# Patient Record
Sex: FEMALE
[De-identification: names, ages, dates, MRNs, and addresses within clinical notes are randomized per-mention and may not be internally consistent; named-entity substitution may affect disease eponyms.]

---

## 2020-03-30 ENCOUNTER — HOSPITAL ENCOUNTER (INPATIENT)
Dept: HOSPITAL 5 - ED | Age: 40
LOS: 2 days | Discharge: HOME | DRG: 897 | End: 2020-04-01
Attending: INTERNAL MEDICINE | Admitting: INTERNAL MEDICINE
Payer: MEDICARE

## 2020-03-30 DIAGNOSIS — F32.9: ICD-10-CM

## 2020-03-30 DIAGNOSIS — E87.6: ICD-10-CM

## 2020-03-30 DIAGNOSIS — E03.9: ICD-10-CM

## 2020-03-30 DIAGNOSIS — Z88.8: ICD-10-CM

## 2020-03-30 DIAGNOSIS — Z90.49: ICD-10-CM

## 2020-03-30 DIAGNOSIS — E11.9: ICD-10-CM

## 2020-03-30 DIAGNOSIS — F10.231: Primary | ICD-10-CM
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
## 2020-03-30 DIAGNOSIS — Z79.899: ICD-10-CM

## 2020-03-30 DIAGNOSIS — F39: ICD-10-CM

## 2020-03-30 LAB
ALBUMIN SERPL-MCNC: 3.6 G/DL (ref 3.9–5)
ALT SERPL-CCNC: 18 UNITS/L (ref 7–56)
APTT BLD: 31 SEC. (ref 24.2–36.6)
BASOPHILS # (AUTO): 0 K/MM3 (ref 0–0.1)
BASOPHILS # (AUTO): 0 K/MM3 (ref 0–0.1)
BASOPHILS NFR BLD AUTO: 0.3 % (ref 0–1.8)
BASOPHILS NFR BLD AUTO: 0.4 % (ref 0–1.8)
BENZODIAZEPINES SCREEN,URINE: (no result)
BILIRUB UR QL STRIP: (no result)
BLOOD UR QL VISUAL: (no result)
BUN SERPL-MCNC: 11 MG/DL (ref 7–17)
BUN/CREAT SERPL: 18 %
CALCIUM SERPL-MCNC: 8.5 MG/DL (ref 8.4–10.2)
EOSINOPHIL # BLD AUTO: 0 K/MM3 (ref 0–0.4)
EOSINOPHIL # BLD AUTO: 0 K/MM3 (ref 0–0.4)
EOSINOPHIL NFR BLD AUTO: 0.3 % (ref 0–4.3)
EOSINOPHIL NFR BLD AUTO: 0.4 % (ref 0–4.3)
HCT VFR BLD CALC: 29.8 % (ref 30.3–42.9)
HCT VFR BLD CALC: 31.1 % (ref 30.3–42.9)
HEMOLYSIS INDEX: 17
HGB BLD-MCNC: 10.1 GM/DL (ref 10.1–14.3)
HGB BLD-MCNC: 10.5 GM/DL (ref 10.1–14.3)
INR PPP: 1.16 (ref 0.87–1.13)
LYMPHOCYTES # BLD AUTO: 0.5 K/MM3 (ref 1.2–5.4)
LYMPHOCYTES # BLD AUTO: 1 K/MM3 (ref 1.2–5.4)
LYMPHOCYTES NFR BLD AUTO: 17.6 % (ref 13.4–35)
LYMPHOCYTES NFR BLD AUTO: 8 % (ref 13.4–35)
MCHC RBC AUTO-ENTMCNC: 34 % (ref 30–34)
MCHC RBC AUTO-ENTMCNC: 34 % (ref 30–34)
MCV RBC AUTO: 91 FL (ref 79–97)
MCV RBC AUTO: 91 FL (ref 79–97)
METHADONE SCREEN,URINE: (no result)
MONOCYTES # (AUTO): 0.4 K/MM3 (ref 0–0.8)
MONOCYTES # (AUTO): 0.6 K/MM3 (ref 0–0.8)
MONOCYTES % (AUTO): 6.3 % (ref 0–7.3)
MONOCYTES % (AUTO): 9.9 % (ref 0–7.3)
MUCOUS THREADS #/AREA URNS HPF: (no result) /HPF
OPIATE SCREEN,URINE: (no result)
PH UR STRIP: 6 [PH] (ref 5–7)
PLATELET # BLD: 176 K/MM3 (ref 140–440)
PLATELET # BLD: 181 K/MM3 (ref 140–440)
RBC # BLD AUTO: 3.29 M/MM3 (ref 3.65–5.03)
RBC # BLD AUTO: 3.43 M/MM3 (ref 3.65–5.03)
RBC #/AREA URNS HPF: 5 /HPF (ref 0–6)
T4 FREE SERPL-MCNC: 1.24 NG/DL (ref 0.76–1.46)
UROBILINOGEN UR-MCNC: 2 MG/DL (ref ?–2)
WBC #/AREA URNS HPF: 2 /HPF (ref 0–6)

## 2020-03-30 PROCEDURE — 85730 THROMBOPLASTIN TIME PARTIAL: CPT

## 2020-03-30 PROCEDURE — 82962 GLUCOSE BLOOD TEST: CPT

## 2020-03-30 PROCEDURE — 80048 BASIC METABOLIC PNL TOTAL CA: CPT

## 2020-03-30 PROCEDURE — 90715 TDAP VACCINE 7 YRS/> IM: CPT

## 2020-03-30 PROCEDURE — 80320 DRUG SCREEN QUANTALCOHOLS: CPT

## 2020-03-30 PROCEDURE — 70450 CT HEAD/BRAIN W/O DYE: CPT

## 2020-03-30 PROCEDURE — 72125 CT NECK SPINE W/O DYE: CPT

## 2020-03-30 PROCEDURE — 84443 ASSAY THYROID STIM HORMONE: CPT

## 2020-03-30 PROCEDURE — 85025 COMPLETE CBC W/AUTO DIFF WBC: CPT

## 2020-03-30 PROCEDURE — 80307 DRUG TEST PRSMV CHEM ANLYZR: CPT

## 2020-03-30 PROCEDURE — 84439 ASSAY OF FREE THYROXINE: CPT

## 2020-03-30 PROCEDURE — 36415 COLL VENOUS BLD VENIPUNCTURE: CPT

## 2020-03-30 PROCEDURE — 83735 ASSAY OF MAGNESIUM: CPT

## 2020-03-30 PROCEDURE — 82550 ASSAY OF CK (CPK): CPT

## 2020-03-30 PROCEDURE — 81001 URINALYSIS AUTO W/SCOPE: CPT

## 2020-03-30 PROCEDURE — 84703 CHORIONIC GONADOTROPIN ASSAY: CPT

## 2020-03-30 PROCEDURE — 80053 COMPREHEN METABOLIC PANEL: CPT

## 2020-03-30 PROCEDURE — G0480 DRUG TEST DEF 1-7 CLASSES: HCPCS

## 2020-03-30 PROCEDURE — 85610 PROTHROMBIN TIME: CPT

## 2020-03-30 RX ADMIN — ENOXAPARIN SODIUM SCH MG: 100 INJECTION SUBCUTANEOUS at 12:53

## 2020-03-30 RX ADMIN — SODIUM CHLORIDE SCH MLS/HR: 0.9 INJECTION, SOLUTION INTRAVENOUS at 12:55

## 2020-03-30 RX ADMIN — POTASSIUM CHLORIDE SCH MLS/HR: 10 INJECTION, SOLUTION INTRAVENOUS at 05:11

## 2020-03-30 RX ADMIN — POTASSIUM CHLORIDE SCH MLS/HR: 10 INJECTION, SOLUTION INTRAVENOUS at 05:10

## 2020-03-30 RX ADMIN — Medication SCH ML: at 12:54

## 2020-03-30 NOTE — EMERGENCY DEPARTMENT REPORT
ED Psych HPI





- General


Chief Complaint: Fall


Stated Complaint: FALL/COMBATIVE


Time Seen by Provider: 03/30/20 00:36


Source: patient (unable to proved hx), RN/MD ([facility] notes reviwed), EMS


Mode of arrival: Stretcher


Limitations: Altered Mental Status





- History of Present Illness


Initial Comments: 





40-year-old female with a past medical history of mood disorder, psychosis, 

diabetes, hypothyroidism, etoh dependance, presents from [facility] with [facility] with acute psychosis, hallucinations, agitation, and combative 

behavior.  As per patient's paperwork patient has a 1013 signed on March 28 for 

hallucinations and acute psychosis.  Labs obtained from March 28 (performed at 

[facility]) included with transport paperwork revealed a negative urine 

pregnancy test and patient had an alcohol level of 70.  CIWA protocol was 

started on March 29 and patient received Tranxene 15 mg 4 times today with last 

dose at 9 PM. Patient fell at [facility] fall was unwithness  and presents with 

lac to posterior scalp. Tetanus status unknown.  Patient was combative with 

staff at [facility] and received IM Haldol 5 mg , Ativan 2 mg IM, and Benadryl 

50 mg IM at 9pm. Patient presents to the ER psychotic, rambling and will not 

answer questions, hallucinating, and requiring multiple providers to provide 

physical restraints. Geodon 20mg IM an Ativan 1 mg IV ordered. Behavior health 

sitter showed me a text from [facility] nurse stating pt's mom reports no 

previous hx of psychosis. Pt has had a ciwa score of 16 at 9:41 am and 21:00 

3/29 as per [facility] chart.








at 4:15 am I spoke to [facility] RN. he states pt did have some mild intermitten

t psychosis upon intake yesterday however, her symptoms worsened throughout the 

day and were uncontrolled with meds.  He also states that patient has some 

chronic bladder issues and self caths twice a day.  He also states that 

patient's fall was unwitnessed.  They were unclear if patient fell or had a 

seizure. RN was informed that pt was going to be admitted. They say they are 

willing to accept her back once her condition improves. 














- Related Data


                                Home Medications











 Medication  Instructions  Recorded  Confirmed  Last Taken


 


ALPRAZolam [Xanax TAB] 1 tab PO PRN PRN 12/16/14 12/17/14 12/16/14 08:00


 


Aspirin/Caffeine [Bc Arthritis 1 pack PO PRN PRN 12/16/14 12/16/14 12/15/14





Powder Packet]    


 


Dextroamphetamine/Amphetamine 1 tab PO BID 12/16/14 12/17/14 12/17/14 07:00





[Adderall]    


 


Levothyroxine [Synthroid] 1 tab PO DAILY 12/16/14 12/17/14 12/17/14 06:00


 


Lovastatin [Altoprev] 20 mg PO DAILY 12/16/14 12/17/14 12/15/14 08:00


 


Methadone HCl 1 tab PO 4XD PRN 12/16/14 12/17/14 12/17/14 08:00


 


Oxycodone HCl/Acetaminophen 1 tab PO PRN PRN 12/16/14 12/17/14 12/17/14 06:00





[Oxycodone-Acetaminophen ]    











                                    Allergies











Allergy/AdvReac Type Severity Reaction Status Date / Time


 


promethazine [From Phenergan] Allergy  Unknown Verified 03/30/20 01:26














ED Review of Systems


ROS: 


Stated complaint: FALL/COMBATIVE


Other details as noted in HPI





Comment: Unobtainable due to pts medical conditions





ED Past Medical Hx





- Past Medical History


Previous Medical History?: Yes


Hx Hypertension: Yes


Hx Diabetes: Yes


Hx GERD: Yes (MILD)


Hx Liver Disease: Yes (fatty liver)


Hx Asthma: Yes (CHILDHOOD ONLY/SEASONAL)


Additional medical history: Drug abuse, hypothyroidism





- Surgical History


Past Surgical History?: Yes


Hx Appendectomy: Yes


Additional Surgical History: Tonsillectomy, T10-T11 fusion, C3 neck fusion





- Social History


Smoking Status: Unknown if ever smoked


Substance Use Type: Alcohol, Other





- Medications


Home Medications: 


                                Home Medications











 Medication  Instructions  Recorded  Confirmed  Last Taken  Type


 


ALPRAZolam [Xanax TAB] 1 tab PO PRN PRN 12/16/14 12/17/14 12/16/14 08:00 History


 


Aspirin/Caffeine [Bc Arthritis 1 pack PO PRN PRN 12/16/14 12/16/14 12/15/14 

History





Powder Packet]     


 


Dextroamphetamine/Amphetamine 1 tab PO BID 12/16/14 12/17/14 12/17/14 07:00 

History





[Adderall]     


 


Levothyroxine [Synthroid] 1 tab PO DAILY 12/16/14 12/17/14 12/17/14 06:00 

History


 


Lovastatin [Altoprev] 20 mg PO DAILY 12/16/14 12/17/14 12/15/14 08:00 History


 


Methadone HCl 1 tab PO 4XD PRN 12/16/14 12/17/14 12/17/14 08:00 History


 


Oxycodone HCl/Acetaminophen 1 tab PO PRN PRN 12/16/14 12/17/14 12/17/14 06:00 

History





[Oxycodone-Acetaminophen ]     














ED Physical Exam





- General


Limitations: Other





- Other


Other exam information: 





General: Distress, combative


Head: 2 cm laceration to posterior scalp


Eyes: normal appearance


ENT: Moist mucous membranes


Neck: Normal appearance, no midline tenderness


Chest: Clear to auscultation bilaterally


CV:  tachycardic regular rhythm


Abdomen: Soft, normal bowel sounds, nontender, nondistended, no rebound or 

guarding


Back: Normal inspection


Extremity: Normal inspection, full range of motion


Neuro: Alert but not answering any direct questions or following commands, 

speech clear, no facial asymmetry, 5/5 upper and lower extremity strength, 

sensation grossly intact


Psych: Altered, combative, psychotic, delusional


Skin: No rash





ED Course


                                   Vital Signs











  03/30/20 03/30/20 03/30/20





  00:20 00:30 00:46


 


Temperature   


 


Pulse Rate  131 H 114 H


 


Respiratory  22 20





Rate   


 


Blood Pressure   132/107


 


O2 Sat by Pulse 79 L 96 





Oximetry   














  03/30/20 03/30/20 03/30/20





  01:00 01:16 01:30


 


Temperature   


 


Pulse Rate 110 H 104 H 99 H


 


Respiratory 22 22 18





Rate   


 


Blood Pressure 111/64 111/64 106/60


 


O2 Sat by Pulse 96 97 99





Oximetry   














  03/30/20 03/30/20 03/30/20





  01:46 02:00 02:16


 


Temperature   98.1 F


 


Pulse Rate 98 H 96 H 94 H


 


Respiratory 20 20 17





Rate   


 


Blood Pressure 99/60 115/81 115/81


 


O2 Sat by Pulse 100 99 100





Oximetry   














  03/30/20 03/30/20 03/30/20





  03:18 03:30 04:00


 


Temperature   


 


Pulse Rate 95 H 95 H 94 H


 


Respiratory 19 17 22





Rate   


 


Blood Pressure   100/72


 


O2 Sat by Pulse 96 96 97





Oximetry   














- Reevaluation(s)


Reevaluation #1: 





03/30/20 03:09


sat remained >95% during ed stay, pulse ox of 79 is an error


03/30/20 03:12


tachycardia improved with sedation (geodon and iv ativan)








- Laceration /Wound Repair


  ** Head


Wound Location: head (Posterior scalp)


Wound Length (cm): 2


Wound's Depth, Shape: linear


Irrigated w/ Saline (ccs): 50


Betadine Prep?: Yes


Number of Sutures: 3 (Staples)


Layer Closure?: No





ED Medical Decision Making





- Lab Data


Result diagrams: 


                                 03/30/20 01:02





                                 03/30/20 01:02








                                   Lab Results











  03/30/20 03/30/20 03/30/20 Range/Units





  00:44 01:02 01:02 


 


WBC   6.4   (4.5-11.0)  K/mm3


 


RBC   3.43 L   (3.65-5.03)  M/mm3


 


Hgb   10.5   (10.1-14.3)  gm/dl


 


Hct   31.1   (30.3-42.9)  %


 


MCV   91   (79-97)  fl


 


MCH   31   (28-32)  pg


 


MCHC   34   (30-34)  %


 


RDW   19.2 H   (13.2-15.2)  %


 


Plt Count   181   (140-440)  K/mm3


 


Lymph % (Auto)   8.0 L   (13.4-35.0)  %


 


Mono % (Auto)   6.3   (0.0-7.3)  %


 


Eos % (Auto)   0.3   (0.0-4.3)  %


 


Baso % (Auto)   0.4   (0.0-1.8)  %


 


Lymph #   0.5 L   (1.2-5.4)  K/mm3


 


Mono #   0.4   (0.0-0.8)  K/mm3


 


Eos #   0.0   (0.0-0.4)  K/mm3


 


Baso #   0.0   (0.0-0.1)  K/mm3


 


Seg Neutrophils %   85.0 H   (40.0-70.0)  %


 


Seg Neutrophils #   5.4   (1.8-7.7)  K/mm3


 


PT  15.0 H    (12.2-14.9)  Sec.


 


INR  1.16 H    (0.87-1.13)  


 


APTT  31.0    (24.2-36.6)  Sec.


 


Sodium    135 L  (137-145)  mmol/L


 


Potassium    3.4 L  (3.6-5.0)  mmol/L


 


Chloride    98.4  ()  mmol/L


 


Carbon Dioxide    25  (22-30)  mmol/L


 


Anion Gap    15  mmol/L


 


BUN    11  (7-17)  mg/dL


 


Creatinine    0.6 L  (0.7-1.2)  mg/dL


 


Estimated GFR    > 60  ml/min


 


BUN/Creatinine Ratio    18  %


 


Glucose    145 H  ()  mg/dL


 


POC Glucose     ()  


 


Calcium    8.5  (8.4-10.2)  mg/dL


 


Magnesium     (1.7-2.3)  mg/dL


 


Total Bilirubin    0.70  (0.1-1.2)  mg/dL


 


AST    34  (5-40)  units/L


 


ALT    18  (7-56)  units/L


 


Alkaline Phosphatase    51  ()  units/L


 


Total Creatine Kinase    629 H  ()  units/L


 


Total Protein    6.1 L  (6.3-8.2)  g/dL


 


Albumin    3.6 L  (3.9-5)  g/dL


 


Albumin/Globulin Ratio    1.4  %


 


TSH     (0.270-4.200)  mlU/mL


 


Free T4     (0.76-1.46)  ng/dL


 


HCG, Qual     (Negative)  


 


Urine Color     (Yellow)  


 


Urine Turbidity     (Clear)  


 


Urine pH     (5.0-7.0)  


 


Ur Specific Gravity     (1.003-1.030)  


 


Urine Protein     (Negative)  mg/dL


 


Urine Glucose (UA)     (Negative)  mg/dL


 


Urine Ketones     (Negative)  mg/dL


 


Urine Blood     (Negative)  


 


Urine Nitrite     (Negative)  


 


Urine Bilirubin     (Negative)  


 


Urine Urobilinogen     (<2.0)  mg/dL


 


Ur Leukocyte Esterase     (Negative)  


 


Urine WBC (Auto)     (0.0-6.0)  /HPF


 


Urine RBC (Auto)     (0.0-6.0)  /HPF


 


U Epithel Cells (Auto)     (0-13.0)  /HPF


 


Urine Mucus     /HPF


 


Salicylates     (2.8-20.0)  mg/dL


 


Urine Opiates Screen     


 


Urine Methadone Screen     


 


Acetaminophen     (10.0-30.0)  ug/mL


 


Ur Barbiturates Screen     


 


Ur Phencyclidine Scrn     


 


Ur Amphetamines Screen     


 


U Benzodiazepines Scrn     


 


Urine Cocaine Screen     


 


U Marijuana (THC) Screen     


 


Drugs of Abuse Note     


 


Plasma/Serum Alcohol     (0-0.07)  %














  03/30/20 03/30/20 03/30/20 Range/Units





  01:02 01:02 01:02 


 


WBC     (4.5-11.0)  K/mm3


 


RBC     (3.65-5.03)  M/mm3


 


Hgb     (10.1-14.3)  gm/dl


 


Hct     (30.3-42.9)  %


 


MCV     (79-97)  fl


 


MCH     (28-32)  pg


 


MCHC     (30-34)  %


 


RDW     (13.2-15.2)  %


 


Plt Count     (140-440)  K/mm3


 


Lymph % (Auto)     (13.4-35.0)  %


 


Mono % (Auto)     (0.0-7.3)  %


 


Eos % (Auto)     (0.0-4.3)  %


 


Baso % (Auto)     (0.0-1.8)  %


 


Lymph #     (1.2-5.4)  K/mm3


 


Mono #     (0.0-0.8)  K/mm3


 


Eos #     (0.0-0.4)  K/mm3


 


Baso #     (0.0-0.1)  K/mm3


 


Seg Neutrophils %     (40.0-70.0)  %


 


Seg Neutrophils #     (1.8-7.7)  K/mm3


 


PT     (12.2-14.9)  Sec.


 


INR     (0.87-1.13)  


 


APTT     (24.2-36.6)  Sec.


 


Sodium     (137-145)  mmol/L


 


Potassium     (3.6-5.0)  mmol/L


 


Chloride     ()  mmol/L


 


Carbon Dioxide     (22-30)  mmol/L


 


Anion Gap     mmol/L


 


BUN     (7-17)  mg/dL


 


Creatinine     (0.7-1.2)  mg/dL


 


Estimated GFR     ml/min


 


BUN/Creatinine Ratio     %


 


Glucose     ()  mg/dL


 


POC Glucose     ()  


 


Calcium     (8.4-10.2)  mg/dL


 


Magnesium     (1.7-2.3)  mg/dL


 


Total Bilirubin     (0.1-1.2)  mg/dL


 


AST     (5-40)  units/L


 


ALT     (7-56)  units/L


 


Alkaline Phosphatase     ()  units/L


 


Total Creatine Kinase     ()  units/L


 


Total Protein     (6.3-8.2)  g/dL


 


Albumin     (3.9-5)  g/dL


 


Albumin/Globulin Ratio     %


 


TSH     (0.270-4.200)  mlU/mL


 


Free T4     (0.76-1.46)  ng/dL


 


HCG, Qual     (Negative)  


 


Urine Color     (Yellow)  


 


Urine Turbidity     (Clear)  


 


Urine pH     (5.0-7.0)  


 


Ur Specific Gravity     (1.003-1.030)  


 


Urine Protein     (Negative)  mg/dL


 


Urine Glucose (UA)     (Negative)  mg/dL


 


Urine Ketones     (Negative)  mg/dL


 


Urine Blood     (Negative)  


 


Urine Nitrite     (Negative)  


 


Urine Bilirubin     (Negative)  


 


Urine Urobilinogen     (<2.0)  mg/dL


 


Ur Leukocyte Esterase     (Negative)  


 


Urine WBC (Auto)     (0.0-6.0)  /HPF


 


Urine RBC (Auto)     (0.0-6.0)  /HPF


 


U Epithel Cells (Auto)     (0-13.0)  /HPF


 


Urine Mucus     /HPF


 


Salicylates  < 0.3 L    (2.8-20.0)  mg/dL


 


Urine Opiates Screen     


 


Urine Methadone Screen     


 


Acetaminophen   < 5.0 L   (10.0-30.0)  ug/mL


 


Ur Barbiturates Screen     


 


Ur Phencyclidine Scrn     


 


Ur Amphetamines Screen     


 


U Benzodiazepines Scrn     


 


Urine Cocaine Screen     


 


U Marijuana (THC) Screen     


 


Drugs of Abuse Note     


 


Plasma/Serum Alcohol    < 0.01  (0-0.07)  %














  03/30/20 03/30/20 03/30/20 Range/Units





  01:02 01:02 01:08 


 


WBC     (4.5-11.0)  K/mm3


 


RBC     (3.65-5.03)  M/mm3


 


Hgb     (10.1-14.3)  gm/dl


 


Hct     (30.3-42.9)  %


 


MCV     (79-97)  fl


 


MCH     (28-32)  pg


 


MCHC     (30-34)  %


 


RDW     (13.2-15.2)  %


 


Plt Count     (140-440)  K/mm3


 


Lymph % (Auto)     (13.4-35.0)  %


 


Mono % (Auto)     (0.0-7.3)  %


 


Eos % (Auto)     (0.0-4.3)  %


 


Baso % (Auto)     (0.0-1.8)  %


 


Lymph #     (1.2-5.4)  K/mm3


 


Mono #     (0.0-0.8)  K/mm3


 


Eos #     (0.0-0.4)  K/mm3


 


Baso #     (0.0-0.1)  K/mm3


 


Seg Neutrophils %     (40.0-70.0)  %


 


Seg Neutrophils #     (1.8-7.7)  K/mm3


 


PT     (12.2-14.9)  Sec.


 


INR     (0.87-1.13)  


 


APTT     (24.2-36.6)  Sec.


 


Sodium     (137-145)  mmol/L


 


Potassium     (3.6-5.0)  mmol/L


 


Chloride     ()  mmol/L


 


Carbon Dioxide     (22-30)  mmol/L


 


Anion Gap     mmol/L


 


BUN     (7-17)  mg/dL


 


Creatinine     (0.7-1.2)  mg/dL


 


Estimated GFR     ml/min


 


BUN/Creatinine Ratio     %


 


Glucose     ()  mg/dL


 


POC Glucose     ()  


 


Calcium     (8.4-10.2)  mg/dL


 


Magnesium   1.20 L   (1.7-2.3)  mg/dL


 


Total Bilirubin     (0.1-1.2)  mg/dL


 


AST     (5-40)  units/L


 


ALT     (7-56)  units/L


 


Alkaline Phosphatase     ()  units/L


 


Total Creatine Kinase     ()  units/L


 


Total Protein     (6.3-8.2)  g/dL


 


Albumin     (3.9-5)  g/dL


 


Albumin/Globulin Ratio     %


 


TSH     (0.270-4.200)  mlU/mL


 


Free T4     (0.76-1.46)  ng/dL


 


HCG, Qual  Negative    (Negative)  


 


Urine Color    Yoko  (Yellow)  


 


Urine Turbidity    Clear  (Clear)  


 


Urine pH    6.0  (5.0-7.0)  


 


Ur Specific Gravity    1.029  (1.003-1.030)  


 


Urine Protein    30 mg/dl  (Negative)  mg/dL


 


Urine Glucose (UA)    Neg  (Negative)  mg/dL


 


Urine Ketones    Tr  (Negative)  mg/dL


 


Urine Blood    Neg  (Negative)  


 


Urine Nitrite    Neg  (Negative)  


 


Urine Bilirubin    Neg  (Negative)  


 


Urine Urobilinogen    2.0  (<2.0)  mg/dL


 


Ur Leukocyte Esterase    Tr  (Negative)  


 


Urine WBC (Auto)    2.0  (0.0-6.0)  /HPF


 


Urine RBC (Auto)    5.0  (0.0-6.0)  /HPF


 


U Epithel Cells (Auto)    1.0  (0-13.0)  /HPF


 


Urine Mucus    2+  /HPF


 


Salicylates     (2.8-20.0)  mg/dL


 


Urine Opiates Screen     


 


Urine Methadone Screen     


 


Acetaminophen     (10.0-30.0)  ug/mL


 


Ur Barbiturates Screen     


 


Ur Phencyclidine Scrn     


 


Ur Amphetamines Screen     


 


U Benzodiazepines Scrn     


 


Urine Cocaine Screen     


 


U Marijuana (THC) Screen     


 


Drugs of Abuse Note     


 


Plasma/Serum Alcohol     (0-0.07)  %














  03/30/20 03/30/20 03/30/20 Range/Units





  01:08 01:39 Unknown 


 


WBC     (4.5-11.0)  K/mm3


 


RBC     (3.65-5.03)  M/mm3


 


Hgb     (10.1-14.3)  gm/dl


 


Hct     (30.3-42.9)  %


 


MCV     (79-97)  fl


 


MCH     (28-32)  pg


 


MCHC     (30-34)  %


 


RDW     (13.2-15.2)  %


 


Plt Count     (140-440)  K/mm3


 


Lymph % (Auto)     (13.4-35.0)  %


 


Mono % (Auto)     (0.0-7.3)  %


 


Eos % (Auto)     (0.0-4.3)  %


 


Baso % (Auto)     (0.0-1.8)  %


 


Lymph #     (1.2-5.4)  K/mm3


 


Mono #     (0.0-0.8)  K/mm3


 


Eos #     (0.0-0.4)  K/mm3


 


Baso #     (0.0-0.1)  K/mm3


 


Seg Neutrophils %     (40.0-70.0)  %


 


Seg Neutrophils #     (1.8-7.7)  K/mm3


 


PT     (12.2-14.9)  Sec.


 


INR     (0.87-1.13)  


 


APTT     (24.2-36.6)  Sec.


 


Sodium     (137-145)  mmol/L


 


Potassium     (3.6-5.0)  mmol/L


 


Chloride     ()  mmol/L


 


Carbon Dioxide     (22-30)  mmol/L


 


Anion Gap     mmol/L


 


BUN     (7-17)  mg/dL


 


Creatinine     (0.7-1.2)  mg/dL


 


Estimated GFR     ml/min


 


BUN/Creatinine Ratio     %


 


Glucose     ()  mg/dL


 


POC Glucose   142 H   ()  


 


Calcium     (8.4-10.2)  mg/dL


 


Magnesium     (1.7-2.3)  mg/dL


 


Total Bilirubin     (0.1-1.2)  mg/dL


 


AST     (5-40)  units/L


 


ALT     (7-56)  units/L


 


Alkaline Phosphatase     ()  units/L


 


Total Creatine Kinase     ()  units/L


 


Total Protein     (6.3-8.2)  g/dL


 


Albumin     (3.9-5)  g/dL


 


Albumin/Globulin Ratio     %


 


TSH    7.120 H  (0.270-4.200)  mlU/mL


 


Free T4    1.24  (0.76-1.46)  ng/dL


 


HCG, Qual     (Negative)  


 


Urine Color     (Yellow)  


 


Urine Turbidity     (Clear)  


 


Urine pH     (5.0-7.0)  


 


Ur Specific Gravity     (1.003-1.030)  


 


Urine Protein     (Negative)  mg/dL


 


Urine Glucose (UA)     (Negative)  mg/dL


 


Urine Ketones     (Negative)  mg/dL


 


Urine Blood     (Negative)  


 


Urine Nitrite     (Negative)  


 


Urine Bilirubin     (Negative)  


 


Urine Urobilinogen     (<2.0)  mg/dL


 


Ur Leukocyte Esterase     (Negative)  


 


Urine WBC (Auto)     (0.0-6.0)  /HPF


 


Urine RBC (Auto)     (0.0-6.0)  /HPF


 


U Epithel Cells (Auto)     (0-13.0)  /HPF


 


Urine Mucus     /HPF


 


Salicylates     (2.8-20.0)  mg/dL


 


Urine Opiates Screen  Presumptive negative    


 


Urine Methadone Screen  Presumptive negative    


 


Acetaminophen     (10.0-30.0)  ug/mL


 


Ur Barbiturates Screen  Presumptive negative    


 


Ur Phencyclidine Scrn  Presumptive negative    


 


Ur Amphetamines Screen  Presumptive positive    


 


U Benzodiazepines Scrn  Presumptive positive    


 


Urine Cocaine Screen  Presumptive negative    


 


U Marijuana (THC) Screen  Presumptive positive    


 


Drugs of Abuse Note  Disclamer    


 


Plasma/Serum Alcohol     (0-0.07)  %














- Radiology Data


Radiology results: report reviewed





CT HEAD/BRAIN WO CON INDICATION / CLINICAL INFORMATION: Pt had a fall with head 

injury, posterior laceration, Psychosis!!. TECHNIQUE: All CT scans at this 

location are performed using CT dose reduction for ALARA by means of automated 

exposure control. COMPARISON: None available. FINDINGS: The study is suboptimal 

due to motion artifact. There is a right posterior frontal scalp laceration. The

ventricular system is normal in size and configuration. No focal lesion or mass 

effect is seen. There is no evidence of intracranial hemorrhage or major vessel 

occlusion. The calvarium is intact. The visualized paranasal sinuses and mastoid

air cells are near. IMPRESSION: No acute intracranial abnormality. 





CT CERVICAL SPINE WO CON INDICATION / CLINICAL INFORMATION: Pt had a fall with 

neck injury, Psychosis!!. TECHNIQUE: All CT scans at this location are performed

using CT dose reduction for ALARA by means of automated exposure control. 

COMPARISON: None available. FINDINGS: There are surgical changes involving the 

posterior elements of C1 and C2. A metallic screw traverses the right lateral 

mass of C2. There is an os odontoideum. Mild degenerative disc disease is 

present at C5-C6. The prevertebral soft tissues are normal. There is no evidence

of acute fracture or subluxation. I see no evidence of a focal disc herniation 

or epidural hematoma. There is mild scarring in both lung apices. IMPRESSION: 

Surgical and developmental changes at C1 and C2. No acute abnormality. 





- Medical Decision Making





UDS + for amphetamines benzos and marijuana. pt receiving benzos at [facility]. 

I do not seen any adhd meds listed on MAR therefore amphatmine abuse is likely 

as well.





Pt sedated and resting after geodon 20mg IM and ativan 1 mg IV





IV Mag and IV potassium ordered to correct mild electrolyte imbalances. Banana 

bag provided





ct head/c spine without acute findings. pt's scalp lac was repaired with 

staples. tetanus provided





As per RN from [facility] mother stated patient does not have history of 

psychosis.  I suspect that patient is having alcohol withdrawal delirium. 

Psychosis may also be amphetamine induced as well however psychosis has worsened

throughout the day and patient has not had had any known access to amphetamines 

while at [facility].  She has failed inpatient [facility] treatment with oral 

benzos and rescue antipsychotic and IM benzos and required ED stabilization.  C

ase was discussed with hospitalist who has agreed to admit patient for alcohol 

withdrawal delirium/psychosis.  Patient is sedated at time of disposition. New 

1013 form signed. 





[facility] protocol ordered





kong ordered since pt typically self caths due to chronic bladder issue and she

is currently sedated





- Differential Diagnosis


drug abuse, new onset psychosis, etoh withdrawal delerium


Critical Care Time: No


Critical care attestation.: 


If time is entered above; I have spent that time in minutes in the direct care 

of this critically ill patient, excluding procedure time.








ED Disposition


Clinical Impression: 


 Psychosis, Alcohol dependence, Alcohol withdrawal delirium, Occipital scalp 

laceration, Medical clearance for psychiatric admission, Hypomagnesemia, 

Hypokalemia, Amphetamine abuse, Chronic retention of urine, Diabetes





Disposition: DC-09 OP ADMIT IP TO THIS HOSP


Is pt being admited?: Yes


Condition: Stable


Additional Instructions: 


Staples to scalp should be removed in 7 to 10 days.


Time of Disposition: 04:23 (Dr Garvey/hospitalist)

## 2020-03-30 NOTE — CAT SCAN REPORT
CT CERVICAL SPINE WO CON 



INDICATION / CLINICAL INFORMATION:

Pt had a fall with neck injury, Psychosis!!.



TECHNIQUE:

All CT scans at this location are performed using CT dose reduction for ALARA by means of automated e
xposure control. 



COMPARISON:

None available.



FINDINGS:

There are surgical changes involving the posterior elements of C1 and C2. A metallic screw traverses 
the right lateral mass of C2. There is an os odontoideum. Mild degenerative disc disease is present a
t C5-C6.



The prevertebral soft tissues are normal. There is no evidence of acute fracture or subluxation. I se
e no evidence of a focal disc herniation or epidural hematoma. There is mild scarring in both lung ap
ices.



IMPRESSION: Surgical and developmental changes at C1 and C2. No acute abnormality. 



Signer Name: Kaushik So MD 

Signed: 3/30/2020 3:15 AM

Workstation Name: [id number]

## 2020-03-30 NOTE — EVENT NOTE
Date: 03/30/20


Patient from [place], admitted for alcohol withdrawal. I have seen and 

examined her.

## 2020-03-30 NOTE — HISTORY AND PHYSICAL REPORT
History of Present Illness


History of present illness: 


40-year-old woman with a history of diabetes, hypothyroidism, alcohol abuse, 

mood disorder was sent over from [place] for evaluation.  Patient is status 

post fall  at [place]  with laceration at the back of her head, and she was 

sent here for further evaluation, in the ER she has been very combative in the 

emergency room, she was given Geodon 20, Ativan 1 mg, patient is now sedated.  

She also was combative overnight rewards and was also given sedatives.  She was 

evaluated [facility] on March 28 for psychosis, alcohol level was 70.  

Patient will be admitted for alcohol delirium tremors





PAST MEDICAL HISTORY: diabetes, hypothyroidism, alcohol abuse, mood disorder





PAST SURGICAL HISTORY: Tonsil, neck, appendectomy





SOCIAL HISTORY: + alcohol, unknown tobacco,  drugs





FAMILY HISTORY: Unknown














Medications and Allergies


                                    Allergies











Allergy/AdvReac Type Severity Reaction Status Date / Time


 


promethazine [From Phenergan] Allergy  Unknown Verified 03/30/20 01:26











                                Home Medications











 Medication  Instructions  Recorded  Confirmed  Last Taken  Type


 


ALPRAZolam [Xanax TAB] 1 tab PO PRN PRN 12/16/14 12/17/14 12/16/14 08:00 History


 


Aspirin/Caffeine [Bc Arthritis 1 pack PO PRN PRN 12/16/14 12/16/14 12/15/14 

History





Powder Packet]     


 


Dextroamphetamine/Amphetamine 1 tab PO BID 12/16/14 12/17/14 12/17/14 07:00 

History





[Adderall]     


 


Levothyroxine [Synthroid] 1 tab PO DAILY 12/16/14 12/17/14 12/17/14 06:00 

History


 


Lovastatin [Altoprev] 20 mg PO DAILY 12/16/14 12/17/14 12/15/14 08:00 History


 


Methadone HCl 1 tab PO 4XD PRN 12/16/14 12/17/14 12/17/14 08:00 History


 


Oxycodone HCl/Acetaminophen 1 tab PO PRN PRN 12/16/14 12/17/14 12/17/14 06:00 

History





[Oxycodone-Acetaminophen ]     











Active Meds: 


Active Medications





Acetaminophen (Tylenol)  650 mg PO Q4H PRN


   PRN Reason: Pain MILD(1-3)/Fever >100.5/HA


Dextrose/Sodium Chloride (D5/0.45ns)  1,000 mls @ 75 mls/hr IV AS DIRECT DARIUS


Lorazepam (Ativan)  2 mg IV Q1HR PRN


   PRN Reason: CIWA-Ar 8-15


Lorazepam (Ativan)  4 mg IV Q1HR PRN


   PRN Reason: CIWA-Ar 16-25


Lorazepam (Ativan)  4 mg IV Q15MIN PRN


   PRN Reason: CIWA-Ar >25


Ondansetron HCl (Zofran)  4 mg IV Q8H PRN


   PRN Reason: Nausea And Vomiting


Sodium Chloride (Sodium Chloride Flush Syringe 10 Ml)  10 ml IV BID DARIUS


Sodium Chloride (Sodium Chloride Flush Syringe 10 Ml)  10 ml IV PRN PRN


   PRN Reason: LINE FLUSH











Exam





- Physical Exam


Narrative exam: 


Gen. appearance: Patient lying in bed, no apparent distress


HEENT: Normocephalic, atraumatic, pupils equally round and reactive to light, 

unable to do extraocular movement , and no sclericterus,. No JVD or thyromegaly 

or nodule,neck supple, no carotid bruit ,mucous membranes moist, unable to 

examine oral cavity


Heart: S1, S2, regular rate and rhythm


Lungs: Clear bilaterally, breathing comfortable


Abdomen: Positive bowel sounds, nontender, nondistended, no organomegaly


Extremity: no edema, cyanosis, clubbing


Skin: No rash, nodules, warm, dry


Neuro: sedated








- Constitutional


Vitals: 


                                        











Temp Pulse Resp BP Pulse Ox


 


 98.1 F   96 H  21   100/72   96 


 


 03/30/20 02:16  03/30/20 05:30  03/30/20 05:30  03/30/20 05:30  03/30/20 05:30














Results





- Labs


CBC & Chem 7: 


                                 03/30/20 01:02





                                 03/30/20 01:02


Labs: 


                              Abnormal lab results











  03/30/20 03/30/20 03/30/20 Range/Units





  00:44 01:02 01:02 


 


RBC   3.43 L   (3.65-5.03)  M/mm3


 


RDW   19.2 H   (13.2-15.2)  %


 


Lymph % (Auto)   8.0 L   (13.4-35.0)  %


 


Lymph #   0.5 L   (1.2-5.4)  K/mm3


 


Seg Neutrophils %   85.0 H   (40.0-70.0)  %


 


PT  15.0 H    (12.2-14.9)  Sec.


 


INR  1.16 H    (0.87-1.13)  


 


Sodium    135 L  (137-145)  mmol/L


 


Potassium    3.4 L  (3.6-5.0)  mmol/L


 


Creatinine    0.6 L  (0.7-1.2)  mg/dL


 


Glucose    145 H  ()  mg/dL


 


POC Glucose     ()  


 


Magnesium     (1.7-2.3)  mg/dL


 


Total Creatine Kinase    629 H  ()  units/L


 


Total Protein    6.1 L  (6.3-8.2)  g/dL


 


Albumin    3.6 L  (3.9-5)  g/dL


 


TSH     (0.270-4.200)  mlU/mL


 


Salicylates     (2.8-20.0)  mg/dL


 


Acetaminophen     (10.0-30.0)  ug/mL














  03/30/20 03/30/20 03/30/20 Range/Units





  01:02 01:02 01:02 


 


RBC     (3.65-5.03)  M/mm3


 


RDW     (13.2-15.2)  %


 


Lymph % (Auto)     (13.4-35.0)  %


 


Lymph #     (1.2-5.4)  K/mm3


 


Seg Neutrophils %     (40.0-70.0)  %


 


PT     (12.2-14.9)  Sec.


 


INR     (0.87-1.13)  


 


Sodium     (137-145)  mmol/L


 


Potassium     (3.6-5.0)  mmol/L


 


Creatinine     (0.7-1.2)  mg/dL


 


Glucose     ()  mg/dL


 


POC Glucose     ()  


 


Magnesium    1.20 L  (1.7-2.3)  mg/dL


 


Total Creatine Kinase     ()  units/L


 


Total Protein     (6.3-8.2)  g/dL


 


Albumin     (3.9-5)  g/dL


 


TSH     (0.270-4.200)  mlU/mL


 


Salicylates  < 0.3 L    (2.8-20.0)  mg/dL


 


Acetaminophen   < 5.0 L   (10.0-30.0)  ug/mL














  03/30/20 03/30/20 Range/Units





  01:39 Unknown 


 


RBC    (3.65-5.03)  M/mm3


 


RDW    (13.2-15.2)  %


 


Lymph % (Auto)    (13.4-35.0)  %


 


Lymph #    (1.2-5.4)  K/mm3


 


Seg Neutrophils %    (40.0-70.0)  %


 


PT    (12.2-14.9)  Sec.


 


INR    (0.87-1.13)  


 


Sodium    (137-145)  mmol/L


 


Potassium    (3.6-5.0)  mmol/L


 


Creatinine    (0.7-1.2)  mg/dL


 


Glucose    ()  mg/dL


 


POC Glucose  142 H   ()  


 


Magnesium    (1.7-2.3)  mg/dL


 


Total Creatine Kinase    ()  units/L


 


Total Protein    (6.3-8.2)  g/dL


 


Albumin    (3.9-5)  g/dL


 


TSH   7.120 H  (0.270-4.200)  mlU/mL


 


Salicylates    (2.8-20.0)  mg/dL


 


Acetaminophen    (10.0-30.0)  ug/mL














- Imaging and Cardiology


CT Scan - head: report reviewed





Assessment and Plan


Assessment


Alcohol withdrawal with DTs


Continue CIWA protocol with IV Ativan


Start IV fluids





Diabetes


Check fingersticks, hold insulin for now until she starts eating





Hypothyroidism


Continue outpatient medications





mood disorder


DVT prophylaxis

## 2020-03-30 NOTE — CAT SCAN REPORT
CT HEAD/BRAIN WO CON 



INDICATION / CLINICAL INFORMATION:

Pt had a fall with head injury, posterior laceration, Psychosis!!.



TECHNIQUE:

All CT scans at this location are performed using CT dose reduction for ALARA by means of automated e
xposure control. 



COMPARISON:

None available.



FINDINGS:

The study is suboptimal due to motion artifact. There is a right posterior frontal scalp laceration. 
The ventricular system is normal in size and configuration. No focal lesion or mass effect is seen. T
here is no evidence of intracranial hemorrhage or major vessel occlusion.



The calvarium is intact. The visualized paranasal sinuses and mastoid air cells are near.



IMPRESSION: No acute intracranial abnormality. 



Signer Name: Kaushik So MD 

Signed: 3/30/2020 3:11 AM

Workstation Name: [id number]

## 2020-03-31 LAB
BUN SERPL-MCNC: 6 MG/DL (ref 7–17)
BUN/CREAT SERPL: 12 %
CALCIUM SERPL-MCNC: 8.1 MG/DL (ref 8.4–10.2)
HEMOLYSIS INDEX: 2

## 2020-03-31 RX ADMIN — ENOXAPARIN SODIUM SCH MG: 100 INJECTION SUBCUTANEOUS at 11:35

## 2020-03-31 RX ADMIN — SODIUM CHLORIDE SCH MLS/HR: 0.9 INJECTION, SOLUTION INTRAVENOUS at 01:39

## 2020-03-31 RX ADMIN — Medication SCH ML: at 11:36

## 2020-03-31 RX ADMIN — SODIUM CHLORIDE SCH MLS/HR: 0.9 INJECTION, SOLUTION INTRAVENOUS at 19:28

## 2020-03-31 NOTE — CONSULTATION
History of Present Illness





- Reason for Consult


Consult date: 03/31/20


Reason for consult: Psych eval





- Chief Complaint


Chief complaint: 





I relapsed





- History of Present Psychiatric Illness


The patient is a 40-year-old  disabled female with history of Alcohol 

use disorder, MDD, diabetes, hypothyroidism who was transferred from [place] 

([facility]) for unwitnessed fall. She was agitated, combative, 

psychotic and required restraints and multiple PRN meds. 





This morning patient is alert, fully oriented, calm and pleasant. She states 

that she relapsed and started drinking alcohol heavily after being sober for 

many months. She states that she was at [place] for alcohol detox; she fell 

and was transferred here. She denies abusing other substances. 





Patient denies being depressed or excessively nervous. Patient eats and sleeps 

well. Patient denies panic attacks, recurrent nightmares or flashbacks. Patient 

denies symptoms suggestive of OCD or PTSD. Patient denies hallucinations, 

paranoia, thought interference and no features suggestive of hypomania or luis.

She completely denies suicidal or homicidal thoughts.





PAST PSYCHIATRIC HISTORY: 


Diagnoses: Alcohol use disorder, MDD


Suicide attempts or Self-harm behavior: denies 


Prior psychiatric hospitalizations: denies 


Substance Abuse history:Alcohol


 Previous psychiatric medications tried: unknown


Outpatient treatment: no





PAST MEDICAL HISTORY: 


DM, Hypothyroidism





Family Psychiatric History


None reported or documented





SOCIAL HISTORY


Marital Status: 


Living Arrangements: with mother


Employment Status: disabled


Access to guns/weapons: Patient denies 


Education: High School


History of Abuse: Patient denies


 Legal History: Patient denies





ROS:


Constitutional: Negative for weight loss


ENT: Negative for stridor


Respiratory: Negative for cough or hemoptysis


All other systems reviewed and are negative





MENTAL STATUS 


General Appearance and Behavior: age appropriate, good eye contact, cooperative 

with questioning and polite 


Cooperation:   Cooperative


Psychomotor Behavior: within normal limits


Mood:   OK


Affect and affective range:   Congruent with stated mood


Thought Process:   Fluent/Logical and Goal-directed


Thought Content:   Within reality


Speech:   Normal volume and Regular rate and rhythm


Intellectual Functioning   Average


Suicidal Ideation:   Denies SI


Homicidal Ideation:   Denies HI


Impulse Control:   intact


Insight and Judgment:   normal insight and judgment


Memory:   Normal


Attention:   Normal


Orientation:   alert and oriented





Diagnosis: 


Alcohol use disorder, severe dependence





RECOMMENDATIONS


MEDICATIONS: Continue detox protocol


Risks, benefits and alternatives of medications discussed with the patient, 

questions answered and consent obtained from patient.


PSYCHOTHERAPY: Supportive psychotherapy provided


MEDICAL: Per primary team


SAFETY SITTER: may discontinue 


DISPOSITION: Per primary team, no indication for acute inpatient psychiatric 

hospitalization at this time


LEGAL STATUS: Will rescind [id number]


FOLLOW-UP: Will sign off. 


Patient to complete Safety Plan with  before discharge. 


 to please give patient resources for out-patient follow-up.  





Thank you for the consult. Please contact with any questions and/or concerns.





Medications and Allergies


                                    Allergies











Allergy/AdvReac Type Severity Reaction Status Date / Time


 


promethazine [From Phenergan] Allergy  Unknown Verified 03/30/20 01:26











                                Home Medications











 Medication  Instructions  Recorded  Confirmed  Last Taken  Type


 


ALPRAZolam [Xanax TAB] 1 tab PO PRN PRN 12/16/14 12/17/14 12/16/14 08:00 History


 


Aspirin/Caffeine [Bc Arthritis 1 pack PO PRN PRN 12/16/14 12/16/14 12/15/14 

History





Powder Packet]     


 


Dextroamphetamine/Amphetamine 1 tab PO BID 12/16/14 12/17/14 12/17/14 07:00 

History





[Adderall]     


 


Levothyroxine [Synthroid] 1 tab PO DAILY 12/16/14 12/17/14 12/17/14 06:00 

History


 


Lovastatin [Altoprev] 20 mg PO DAILY 12/16/14 12/17/14 12/15/14 08:00 History


 


Methadone HCl 1 tab PO 4XD PRN 12/16/14 12/17/14 12/17/14 08:00 History


 


Oxycodone HCl/Acetaminophen 1 tab PO PRN PRN 12/16/14 12/17/14 12/17/14 06:00 

History





[Oxycodone-Acetaminophen ]     











Active Meds: 


Active Medications





Acetaminophen (Tylenol)  650 mg PO Q4H PRN


   PRN Reason: Pain MILD(1-3)/Fever >100.5/HA


Dextrose (D50w (25gm) Syringe)  50 ml IV Q30MIN PRN; Protocol


   PRN Reason: Hypoglycemia


Enoxaparin Sodium (Enoxaparin)  40 mg SUB-Q QDAY@1000 DARIUS


   Last Admin: 03/30/20 12:53 Dose:  40 mg


   Documented by: 


Sodium Chloride (Nacl 0.9% 1000 Ml)  1,000 mls @ 75 mls/hr IV AS DIRECT DARIUS


   Last Admin: 03/31/20 01:39 Dose:  75 mls/hr


   Documented by: 


Lorazepam (Ativan)  2 mg IV Q1HR PRN


   PRN Reason: CIWA-Ar 8-15


Lorazepam (Ativan)  4 mg IV Q1HR PRN


   PRN Reason: CIWA-Ar 16-25


Lorazepam (Ativan)  4 mg IV Q15MIN PRN


   PRN Reason: CIWA-Ar >25


Ondansetron HCl (Zofran)  4 mg IV Q8H PRN


   PRN Reason: Nausea And Vomiting


Sodium Chloride (Sodium Chloride Flush Syringe 10 Ml)  10 ml IV BID [facility]


   Last Admin: 03/30/20 12:54 Dose:  10 ml


   Documented by: 


Sodium Chloride (Sodium Chloride Flush Syringe 10 Ml)  10 ml IV PRN PRN


   PRN Reason: LINE FLUSH











Mental Status Exam





- Vital signs


                                Last Vital Signs











Temp  97 F L  03/31/20 08:00


 


Pulse  97 H  03/31/20 08:00


 


Resp  16   03/31/20 08:00


 


BP  143/85   03/31/20 08:00


 


Pulse Ox  97   03/31/20 08:00














Results


Result Diagrams: 


                                 03/30/20 07:00





                                 03/30/20 01:02


                              Abnormal lab results











  03/30/20 03/30/20 Range/Units





  21:36 22:39 


 


POC Glucose  62 L  124 H  ()  








All other labs normal.

## 2020-03-31 NOTE — PROGRESS NOTE
Assessment and Plan


Assessment and plan: 





Alcohol withdrawal with DTs


Continue CIWA protocol with IV Ativan


Start IV fluids





Diabetes


Check fingersticks, hold insulin for now until she starts eating





Hypothyroidism


Continue outpatient medications





mood disorder


DVT prophylaxis





Anticipate d/c in am





History


Interval history: 





No new issues





Hospitalist Physical





- Constitutional


Vitals: 


                                        











Temp Pulse Resp BP Pulse Ox


 


 97 F L  97 H  18   143/85   97 


 


 20 08:00  20 08:00  20 08:00  20 08:00  20 08:00











General appearance: Present: no acute distress, well-nourished





- EENT


Eyes: Present: PERRL, EOM intact


ENT: hearing intact, clear oral mucosa, dentition normal





- Neck


Neck: Present: supple, normal ROM





- Respiratory


Respiratory effort: normal


Respiratory: bilateral: CTA





- Cardiovascular


Rhythm: regular


Heart Sounds: Present: S1 & S2.  Absent: gallop, rub





- Extremities


Extremities: no ischemia, No edema, Full ROM





- Abdominal


General gastrointestinal: soft, non-tender, non-distended, normal bowel sounds





- Integumentary


Integumentary: Present: clear, warm, dry





- Neurologic


Neurologic: CNII-XII intact, moves all extremities





Results





- Labs


CBC & Chem 7: 


                                 20 07:00





                                 20 12:37


Labs: 


                             Laboratory Last Values











WBC  6.0 K/mm3 (4.5-11.0)   20  07:00    


 


RBC  3.29 M/mm3 (3.65-5.03)  L  20  07:00    


 


Hgb  10.1 gm/dl (10.1-14.3)   20  07:00    


 


Hct  29.8 % (30.3-42.9)  L  20  07:00    


 


MCV  91 fl (79-97)   20  07:00    


 


MCH  31 pg (28-32)   20  07:00    


 


MCHC  34 % (30-34)   20  07:00    


 


RDW  19.1 % (13.2-15.2)  H  20  07:00    


 


Plt Count  176 K/mm3 (140-440)   20  07:00    


 


Lymph % (Auto)  17.6 % (13.4-35.0)   03/30/20  07:00    


 


Mono % (Auto)  9.9 % (0.0-7.3)  H  20  07:00    


 


Eos % (Auto)  0.4 % (0.0-4.3)   20  07:00    


 


Baso % (Auto)  0.3 % (0.0-1.8)   20  07:00    


 


Lymph #  1.0 K/mm3 (1.2-5.4)  L  20  07:00    


 


Mono #  0.6 K/mm3 (0.0-0.8)   20  07:00    


 


Eos #  0.0 K/mm3 (0.0-0.4)   20  07:00    


 


Baso #  0.0 K/mm3 (0.0-0.1)   20  07:00    


 


Seg Neutrophils %  71.8 % (40.0-70.0)  H  20  07:00    


 


Seg Neutrophils #  4.3 K/mm3 (1.8-7.7)   20  07:00    


 


PT  15.0 Sec. (12.2-14.9)  H  20  00:44    


 


INR  1.16  (0.87-1.13)  H  20  00:44    


 


APTT  31.0 Sec. (24.2-36.6)   20  00:44    


 


Sodium  137 mmol/L (137-145)   20  12:37    


 


Potassium  4.2 mmol/L (3.6-5.0)  D 20  12:37    


 


Chloride  103.4 mmol/L ()   20  12:37    


 


Carbon Dioxide  24 mmol/L (22-30)   20  12:37    


 


Anion Gap  14 mmol/L  20  12:37    


 


BUN  6 mg/dL (7-17)  L  20  12:37    


 


Creatinine  0.5 mg/dL (0.7-1.2)  L  20  12:37    


 


Estimated GFR  > 60 ml/min  20  12:37    


 


BUN/Creatinine Ratio  12 %  20  12:37    


 


Glucose  128 mg/dL ()  H  20  12:37    


 


POC Glucose  170  ()  H  20  12:07    


 


Calcium  8.1 mg/dL (8.4-10.2)  L  20  12:37    


 


Magnesium  1.60 mg/dL (1.7-2.3)  L  20  12:37    


 


Total Bilirubin  0.70 mg/dL (0.1-1.2)   20  01:02    


 


AST  34 units/L (5-40)   20  01:02    


 


ALT  18 units/L (7-56)   20  01:02    


 


Alkaline Phosphatase  51 units/L ()   20  01:02    


 


Total Creatine Kinase  629 units/L ()  H  20  01:02    


 


Total Protein  6.1 g/dL (6.3-8.2)  L  20  01:02    


 


Albumin  3.6 g/dL (3.9-5)  L  20  01:02    


 


Albumin/Globulin Ratio  1.4 %  20  01:02    


 


TSH  7.120 mlU/mL (0.270-4.200)  H  20  Unknown


 


Free T4  1.24 ng/dL (0.76-1.46)   20  Unknown


 


HCG, Qual  Negative  (Negative)   20  01:02    


 


Urine Color  Yoko  (Yellow)   20  01:08    


 


Urine Turbidity  Clear  (Clear)   20  01:08    


 


Urine pH  6.0  (5.0-7.0)   20  01:08    


 


Ur Specific Gravity  1.029  (1.003-1.030)   20  01:08    


 


Urine Protein  30 mg/dl mg/dL (Negative)   20  01:08    


 


Urine Glucose (UA)  Neg mg/dL (Negative)   20  01:08    


 


Urine Ketones  Tr mg/dL (Negative)   20  01:08    


 


Urine Blood  Neg  (Negative)   20  01:08    


 


Urine Nitrite  Neg  (Negative)   20  01:08    


 


Urine Bilirubin  Neg  (Negative)   20  01:08    


 


Urine Urobilinogen  2.0 mg/dL (<2.0)   20  01:08    


 


Ur Leukocyte Esterase  Tr  (Negative)   20  01:08    


 


Urine WBC (Auto)  2.0 /HPF (0.0-6.0)   20  01:08    


 


Urine RBC (Auto)  5.0 /HPF (0.0-6.0)   20  01:08    


 


U Epithel Cells (Auto)  1.0 /HPF (0-13.0)   20  01:08    


 


Urine Mucus  2+ /HPF  20  01:08    


 


Salicylates  < 0.3 mg/dL (2.8-20.0)  L  20  01:02    


 


Urine Opiates Screen  Presumptive negative   20  01:08    


 


Urine Methadone Screen  Presumptive negative   20  01:08    


 


Acetaminophen  < 5.0 ug/mL (10.0-30.0)  L  20  01:02    


 


Ur Barbiturates Screen  Presumptive negative   20  01:08    


 


Ur Phencyclidine Scrn  Presumptive negative   20  01:08    


 


Ur Amphetamines Screen  Presumptive positive   20  01:08    


 


U Benzodiazepines Scrn  Presumptive positive   20  01:08    


 


Urine Cocaine Screen  Presumptive negative   20  01:08    


 


U Marijuana (THC) Screen  Presumptive positive   20  01:08    


 


Drugs of Abuse Note  Disclamer   20  01:08    


 


Plasma/Serum Alcohol  < 0.01 % (0-0.07)   20  01:02    











Ann/IV: 


                                        





Voiding Method                   Indwelling Catheter


IV Catheter Type [Right          Peripheral IV


Forearm]                         











Active Medications





- Current Medications


Current Medications: 














Generic Name Dose Route Start Last Admin





  Trade Name Freq  PRN Reason Stop Dose Admin


 


Acetaminophen  650 mg  20 06:01 





  Tylenol  PO  





  Q4H PRN  





  Pain MILD(1-3)/Fever >100.5/HA  


 


Dextrose  50 ml  20 06:28 





  D50w (25gm) Syringe  IV  





  Q30MIN PRN  





  Hypoglycemia  





  Protocol  


 


Enoxaparin Sodium  40 mg  20 10:00  20 11:35





  Enoxaparin  SUB-Q   40 mg





  QDAY@1000 DARIUS   Administration


 


Sodium Chloride  1,000 mls @ 75 mls/hr  20 06:15  20 01:39





  Nacl 0.9% 1000 Ml  IV   75 mls/hr





  AS DIRECT DARIUS   Administration


 


Lorazepam  2 mg  20 03:10 





  Ativan  IV  





  Q1HR PRN  





  CIWA-Ar 8-15  


 


Lorazepam  4 mg  20 03:10 





  Ativan  IV  





  Q1HR PRN  





  CIWA-Ar 16-25  


 


Lorazepam  4 mg  20 03:10 





  Ativan  IV  





  Q15MIN PRN  





  CIWA-Ar >25  


 


Ondansetron HCl  4 mg  20 06:01 





  Zofran  IV  





  Q8H PRN  





  Nausea And Vomiting  


 


Sodium Chloride  10 ml  20 10:00  20 11:36





  Sodium Chloride Flush Syringe 10 Ml  IV   10 ml





  BID DARIUS   Administration


 


Sodium Chloride  10 ml  20 06:01 





  Sodium Chloride Flush Syringe 10 Ml  IV  





  PRN PRN  





  LINE FLUSH  














Nutrition/Malnutrition Assess





- Dietary Evaluation


Nutrition/Malnutrition Findings: 


                                        





Nutrition Notes                                            Start:  20 

15:07


Freq:                                                      Status: Active       




Protocol:                                                                       




 Document     20 15:07  LISA  (Rec: 20 15:11  LISA  [id number]

[id number])


 Nutrition Notes


     Need for Assessment generated from:         RN Referral,MST


     Initial or Follow up                        Assessment


     Current Diagnosis                           Diabetes


     Other Pertinent Diagnosis                   s/p fall, EtOH withdrawal with


                                                 DTs


     Current Diet                                Consistent CHO


     Labs/Tests                                  reviewed


     Pertinent Medications                       reviewed


     Height                                      5 ft 8 in


     Weight                                      72.5 kg


     Ideal Body Weight (kg)                      63.63


     BMI                                         24.3


     Weight Status                               Appropriate


     Subjective/Other Information                Pt screened for malnutrition


                                                 risk.  She is from [facility]


                                                 facility and on CIWA protocol.


     Burn                                        Absent


     Trauma                                      Absent


     Minimum of two criteria                     No


     #1


      Nutrition Diagnosis                        Predicted suboptimal energy


                                                 intake


      Etiology                                   hx of EtOH dependence


      As Evidenced by Signs and Symptoms         pt admitted for EtOH


                                                 withdrawal


     Is patient on ventilator?                   No


     Is Patient Ambulatory and/or Out of Bed     No


     REE-([place]-confined to bed)      [phone]


     Calculation Used for Recommendations        [facility]


     Additional Notes                            Pro needs 0.8-1g/k-73g/


                                                 day


                                                 Fluid needs 1ml/kcal


 Nutrition Intervention


     Change Diet Order:                          Continue current diet order


     Goal #1                                     PO intake to meet at least 75%


                                                 energy and pro needs


     Anticipated Discharge Needs:                None identified at this time


     Follow-Up By:                               20


     Additional Comments                         F/U: intakes, need for ONS

## 2020-04-01 VITALS — DIASTOLIC BLOOD PRESSURE: 95 MMHG | SYSTOLIC BLOOD PRESSURE: 143 MMHG

## 2020-04-01 RX ADMIN — Medication SCH ML: at 10:39

## 2020-04-01 RX ADMIN — ENOXAPARIN SODIUM SCH MG: 100 INJECTION SUBCUTANEOUS at 10:39

## 2020-04-01 RX ADMIN — Medication SCH: at 07:28

## 2020-04-01 RX ADMIN — SODIUM CHLORIDE SCH MLS/HR: 0.9 INJECTION, SOLUTION INTRAVENOUS at 06:25

## 2020-04-01 NOTE — DISCHARGE SUMMARY
Providers





- Providers


Date of Admission: 


03/30/20 06:10





Date of discharge: 04/01/20


Attending physician: 


BERNARDO CHAHAL





                                        





03/30/20 16:49


Consult to Mental Health [CONS] Routine 


   Reason For Exam: Mood disorder. From [facility]











Primary care physician: 


PRIMARY CARE MD








Hospitalization


Reason for admission: AMS


Condition: Stable


Hospital course: 





The patient is a 40-year-old  disabled female with history of Alcohol 

use disorder, MDD, diabetes, hypothyroidism who was transferred from [facility] 

(a psychiatric hospital) for unwitnessed fall. She was agitated, combative, 

psychotic and required restraints and multiple PRN meds.  The patient was 

admitted for alcohol withdrawal.  The patient was placed under CIWA protocol.  

The patient eventually stabilized.  Patient was also seen by psychiatry who felt

 that there was  no indication for acute inpatient psychiatric hospitalization 

at this time.  A 1013 was initially placed which was discontinued by psychiatry.

  Patient is felt to have received maximal hospital benefit and will be 

discharged home.  Dedicated discharge time 35 minutes


Disposition: DC-01 TO HOME OR SELFCARE


Time spent for discharge: 35





- Discharge Diagnoses


(1) Alcohol dependence


Status: Acute   





(2) Alcohol withdrawal delirium


Status: Acute   





(3) Diabetes


Status: Acute   





Core Measure Documentation





- Palliative Care


Palliative Care/ Comfort Measures: Not Applicable





- Core Measures


Any of the following diagnoses?: none





Exam





- Constitutional


Vitals: 


                                        











Temp Pulse Resp BP Pulse Ox


 


 98.2 F   102 H  18   152/85   96 


 


 04/01/20 08:16  04/01/20 08:16  04/01/20 08:16  04/01/20 08:16  04/01/20 08:16











General appearance: Present: no acute distress, well-nourished





- EENT


Eyes: Present: PERRL


ENT: hearing intact, clear oral mucosa





- Neck


Neck: Present: supple, normal ROM





- Respiratory


Respiratory effort: normal


Respiratory: bilateral: CTA





- Cardiovascular


Heart Sounds: Present: S1 & S2.  Absent: rub, click





- Extremities


Extremities: pulses symmetrical, No edema


Peripheral Pulses: within normal limits





- Abdominal


General gastrointestinal: Present: soft, non-tender, non-distended, normal bowel

 sounds


Female genitourinary: Present: normal





- Integumentary


Integumentary: Present: clear, warm, dry





- Musculoskeletal


Musculoskeletal: gait normal, strength equal bilaterally





- Psychiatric


Psychiatric: appropriate mood/affect, intact judgment & insight





- Neurologic


Neurologic: CNII-XII intact, moves all extremities





Plan


Activity: advance as tolerated


Weight Bearing Status: Weight Bear as Tolerated


Diet: diabetic